# Patient Record
(demographics unavailable — no encounter records)

---

## 2024-12-03 NOTE — ASSESSMENT
[FreeTextEntry1] : Impression: Nondisplaced Salter I fracture distal phalanx.  Right hallux  He will be treated with restricted activities along with appropriate shoewear no gym/sports until the first of the year return as needed

## 2024-12-03 NOTE — PHYSICAL EXAM
[FreeTextEntry1] : Exam today an antalgic gait is noted avoiding pushoff there is obvious swelling and ecchymosis without deformity to the hallux with restricted motion to remainder of the right foot is unremarkable.  X-rays ordered and taken today of the right hallux reveal Salter I fracture distal phalanx in good alignment

## 2024-12-03 NOTE — HISTORY OF PRESENT ILLNESS
[FreeTextEntry1] : This 16-year-old healthy adolescent is seen for evaluation of his right big toe.  This patient was well until 3 days ago when the patient sustained blunt trauma at home walking barefoot against furniture.  This has precipitated pain swelling bruising and stiffness.  Past history is noncontributory